# Patient Record
Sex: FEMALE | Race: OTHER | NOT HISPANIC OR LATINO
[De-identification: names, ages, dates, MRNs, and addresses within clinical notes are randomized per-mention and may not be internally consistent; named-entity substitution may affect disease eponyms.]

---

## 2022-01-06 ENCOUNTER — TRANSCRIPTION ENCOUNTER (OUTPATIENT)
Age: 62
End: 2022-01-06

## 2023-05-12 ENCOUNTER — APPOINTMENT (OUTPATIENT)
Dept: THORACIC SURGERY | Facility: CLINIC | Age: 63
End: 2023-05-12
Payer: COMMERCIAL

## 2023-05-12 VITALS — WEIGHT: 234 LBS | BODY MASS INDEX: 33.88 KG/M2 | HEIGHT: 69.5 IN

## 2023-05-12 DIAGNOSIS — Z87.891 PERSONAL HISTORY OF NICOTINE DEPENDENCE: ICD-10-CM

## 2023-05-12 DIAGNOSIS — Z80.1 FAMILY HISTORY OF MALIGNANT NEOPLASM OF TRACHEA, BRONCHUS AND LUNG: ICD-10-CM

## 2023-05-12 PROBLEM — Z00.00 ENCOUNTER FOR PREVENTIVE HEALTH EXAMINATION: Status: ACTIVE | Noted: 2023-05-12

## 2023-05-12 PROCEDURE — G0296 VISIT TO DETERM LDCT ELIG: CPT

## 2023-05-12 NOTE — PLAN
[Smoking Cessation] : smoking cessation [FreeTextEntry1] : Plan:\par \par -Low dose CT chest for lung cancer screening. COLUMBA YAÑEZ ordered the low dose CT.  \par Auth [id number] exp 8/6/23                                                                       \par \par -Follow up with patient and her referring provider after her LDCT results have been reviewed by the multidisciplinary clinical team, if needed.\par \par -Encourage continued smoking abstinence.\par \par Should I screen? tool utilized. 6 Year risk of lung cancer is  4.2 %. \par \par Patient wishes to proceed with screening.\par \par Engaged in discussion regarding risks of screening during Covid-19 pandemic and precautions that are being used  to reduce exposure.\par \par Engaged in shared decision making with Ms. KEARNEY . Answered all questions. She verbalized understanding and agreement. She knows to call back with and questions or concerns.\par

## 2023-05-12 NOTE — ASSESSMENT
[Maintenance] : Maintenance: The patient has quit for more than 6 months [de-identified] : Reports confidence in maintaining smoking abstinence

## 2023-05-12 NOTE — HISTORY OF PRESENT ILLNESS
[Former] : Former [TextBox_13] : Referred by Dr. Knutson\par \par ANTHONY KEARNEY had telephonic visit for a review of eligibility and discussion of the Low dose CT lung cancer screening program. The following was reviewed and confirmed the patient meets screening eligibility criteria.\par -Age 62 year\par Smoking Status:\par -Former smoker\par Started smoking at 14 years.\par -Number of pack(s) per day:  1 PPD\par -Number of years smoked:   43\par -Number of pack years smokin\par -Number of years since quitting smokin\par -Quit year:  \par \par Ms. KEARNEY reports intentional weight loss of approximately 30 lbs. She  denies any signs or symptoms of lung cancer including new cough, changing cough, hemoptysis, and unintentional weight loss. \par \par Ms. KEARNEY reports covid infection 2022. Now has long Covid, still has symptoms. Hx renal colic. Thyroidectomy 20 years ago.   Denies any history of lung disease. She  denies any personal history of lung cancer. Reports no lung cancer in a 1st degree relative. Reports no lung cancer in a 2nd degree relative. Works as a  and is exposed to hair products including carotene treatments that has formaldehyde.  Had exposure to 2nd hand smoke.\par  [YearQuit] : 2017 [PacksperYear] : 43

## 2023-06-02 ENCOUNTER — NON-APPOINTMENT (OUTPATIENT)
Age: 63
End: 2023-06-02

## 2024-07-25 ENCOUNTER — NON-APPOINTMENT (OUTPATIENT)
Age: 64
End: 2024-07-25